# Patient Record
Sex: MALE | Race: WHITE | Employment: OTHER | URBAN - METROPOLITAN AREA
[De-identification: names, ages, dates, MRNs, and addresses within clinical notes are randomized per-mention and may not be internally consistent; named-entity substitution may affect disease eponyms.]

---

## 2017-05-01 ENCOUNTER — RECORDS - HEALTHEAST (OUTPATIENT)
Dept: LAB | Facility: CLINIC | Age: 72
End: 2017-05-01

## 2017-05-01 LAB
CHOLEST SERPL-MCNC: 165 MG/DL
FASTING STATUS PATIENT QL REPORTED: NORMAL
HDLC SERPL-MCNC: 44 MG/DL
LDLC SERPL CALC-MCNC: 99 MG/DL
PSA SERPL-MCNC: 1.8 NG/ML (ref 0–6.5)
TRIGL SERPL-MCNC: 109 MG/DL

## 2017-11-08 ENCOUNTER — OFFICE VISIT - HEALTHEAST (OUTPATIENT)
Dept: AUDIOLOGY | Facility: CLINIC | Age: 72
End: 2017-11-08

## 2017-11-08 DIAGNOSIS — H73.91 TYMPANIC MEMBRANE DISORDER, RIGHT: ICD-10-CM

## 2017-11-08 DIAGNOSIS — H90.3 SENSORINEURAL HEARING LOSS, ASYMMETRICAL: ICD-10-CM

## 2017-12-08 ENCOUNTER — OFFICE VISIT - HEALTHEAST (OUTPATIENT)
Dept: OTOLARYNGOLOGY | Facility: CLINIC | Age: 72
End: 2017-12-08

## 2017-12-08 DIAGNOSIS — H90.3 ASNHL (ASYMMETRICAL SENSORINEURAL HEARING LOSS): ICD-10-CM

## 2017-12-21 ENCOUNTER — HOSPITAL ENCOUNTER (OUTPATIENT)
Dept: MRI IMAGING | Facility: HOSPITAL | Age: 72
Discharge: HOME | End: 2017-12-21
Attending: OTOLARYNGOLOGY

## 2017-12-21 DIAGNOSIS — H90.3 ASNHL (ASYMMETRICAL SENSORINEURAL HEARING LOSS): ICD-10-CM

## 2017-12-22 ENCOUNTER — COMMUNICATION - HEALTHEAST (OUTPATIENT)
Dept: OTOLARYNGOLOGY | Facility: CLINIC | Age: 72
End: 2017-12-22

## 2018-03-23 ENCOUNTER — COMMUNICATION - HEALTHEAST (OUTPATIENT)
Dept: ADMINISTRATIVE | Facility: CLINIC | Age: 73
End: 2018-03-23

## 2018-03-27 ENCOUNTER — OFFICE VISIT - HEALTHEAST (OUTPATIENT)
Dept: AUDIOLOGY | Facility: CLINIC | Age: 73
End: 2018-03-27

## 2018-03-27 DIAGNOSIS — H90.3 SENSORINEURAL HEARING LOSS, ASYMMETRICAL: ICD-10-CM

## 2018-04-30 ENCOUNTER — COMMUNICATION - HEALTHEAST (OUTPATIENT)
Dept: AUDIOLOGY | Facility: CLINIC | Age: 73
End: 2018-04-30

## 2018-05-07 ENCOUNTER — RECORDS - HEALTHEAST (OUTPATIENT)
Dept: LAB | Facility: CLINIC | Age: 73
End: 2018-05-07

## 2018-05-07 LAB
ALBUMIN SERPL-MCNC: 3.7 G/DL (ref 3.5–5)
ALP SERPL-CCNC: 84 U/L (ref 45–120)
ALT SERPL W P-5'-P-CCNC: 14 U/L (ref 0–45)
ANION GAP SERPL CALCULATED.3IONS-SCNC: 11 MMOL/L (ref 5–18)
AST SERPL W P-5'-P-CCNC: 18 U/L (ref 0–40)
BILIRUB SERPL-MCNC: 0.9 MG/DL (ref 0–1)
BUN SERPL-MCNC: 19 MG/DL (ref 8–28)
CALCIUM SERPL-MCNC: 9.6 MG/DL (ref 8.5–10.5)
CHLORIDE BLD-SCNC: 109 MMOL/L (ref 98–107)
CHOLEST SERPL-MCNC: 157 MG/DL
CO2 SERPL-SCNC: 23 MMOL/L (ref 22–31)
CREAT SERPL-MCNC: 1.1 MG/DL (ref 0.7–1.3)
FASTING STATUS PATIENT QL REPORTED: NORMAL
GFR SERPL CREATININE-BSD FRML MDRD: >60 ML/MIN/1.73M2
GLUCOSE BLD-MCNC: 95 MG/DL (ref 70–125)
HDLC SERPL-MCNC: 45 MG/DL
LDLC SERPL CALC-MCNC: 89 MG/DL
POTASSIUM BLD-SCNC: 5 MMOL/L (ref 3.5–5)
PROT SERPL-MCNC: 6.4 G/DL (ref 6–8)
PSA SERPL-MCNC: 1.7 NG/ML (ref 0–6.5)
SODIUM SERPL-SCNC: 143 MMOL/L (ref 136–145)
TRIGL SERPL-MCNC: 116 MG/DL
URATE SERPL-MCNC: 7.2 MG/DL (ref 3–8)

## 2018-05-29 ENCOUNTER — OFFICE VISIT - HEALTHEAST (OUTPATIENT)
Dept: AUDIOLOGY | Facility: CLINIC | Age: 73
End: 2018-05-29

## 2018-05-29 DIAGNOSIS — H90.3 SENSORINEURAL HEARING LOSS, ASYMMETRICAL: ICD-10-CM

## 2018-06-12 ENCOUNTER — OFFICE VISIT - HEALTHEAST (OUTPATIENT)
Dept: AUDIOLOGY | Facility: CLINIC | Age: 73
End: 2018-06-12

## 2018-06-12 DIAGNOSIS — H90.3 SENSORINEURAL HEARING LOSS, ASYMMETRICAL: ICD-10-CM

## 2019-05-10 ENCOUNTER — RECORDS - HEALTHEAST (OUTPATIENT)
Dept: LAB | Facility: CLINIC | Age: 74
End: 2019-05-10

## 2019-05-10 LAB
ALBUMIN SERPL-MCNC: 4 G/DL (ref 3.5–5)
ALP SERPL-CCNC: 73 U/L (ref 45–120)
ALT SERPL W P-5'-P-CCNC: 15 U/L (ref 0–45)
ANION GAP SERPL CALCULATED.3IONS-SCNC: 10 MMOL/L (ref 5–18)
AST SERPL W P-5'-P-CCNC: 16 U/L (ref 0–40)
BILIRUB SERPL-MCNC: 0.7 MG/DL (ref 0–1)
BUN SERPL-MCNC: 25 MG/DL (ref 8–28)
CALCIUM SERPL-MCNC: 10.5 MG/DL (ref 8.5–10.5)
CHLORIDE BLD-SCNC: 107 MMOL/L (ref 98–107)
CHOLEST SERPL-MCNC: 189 MG/DL
CO2 SERPL-SCNC: 24 MMOL/L (ref 22–31)
CREAT SERPL-MCNC: 1.28 MG/DL (ref 0.7–1.3)
FASTING STATUS PATIENT QL REPORTED: YES
GFR SERPL CREATININE-BSD FRML MDRD: 55 ML/MIN/1.73M2
GLUCOSE BLD-MCNC: 99 MG/DL (ref 70–125)
HDLC SERPL-MCNC: 50 MG/DL
LDLC SERPL CALC-MCNC: 108 MG/DL
POTASSIUM BLD-SCNC: 4.8 MMOL/L (ref 3.5–5)
PROT SERPL-MCNC: 6.6 G/DL (ref 6–8)
PSA SERPL-MCNC: 1.9 NG/ML (ref 0–6.5)
SODIUM SERPL-SCNC: 141 MMOL/L (ref 136–145)
TRIGL SERPL-MCNC: 157 MG/DL
URATE SERPL-MCNC: 7.4 MG/DL (ref 3–8)

## 2019-06-04 ENCOUNTER — COMMUNICATION - HEALTHEAST (OUTPATIENT)
Dept: AUDIOLOGY | Facility: CLINIC | Age: 74
End: 2019-06-04

## 2019-06-21 ENCOUNTER — OFFICE VISIT - HEALTHEAST (OUTPATIENT)
Dept: AUDIOLOGY | Facility: CLINIC | Age: 74
End: 2019-06-21

## 2019-06-21 DIAGNOSIS — H90.3 SENSORINEURAL HEARING LOSS, ASYMMETRICAL: ICD-10-CM

## 2019-08-07 ENCOUNTER — HOSPITAL ENCOUNTER (EMERGENCY)
Facility: CLINIC | Age: 74
Discharge: HOME | End: 2019-08-07
Attending: EMERGENCY MEDICINE | Admitting: EMERGENCY MEDICINE
Payer: COMMERCIAL

## 2019-08-07 ENCOUNTER — APPOINTMENT (OUTPATIENT)
Dept: GENERAL RADIOLOGY | Facility: CLINIC | Age: 74
End: 2019-08-07
Attending: EMERGENCY MEDICINE
Payer: COMMERCIAL

## 2019-08-07 VITALS
HEIGHT: 72 IN | SYSTOLIC BLOOD PRESSURE: 148 MMHG | OXYGEN SATURATION: 96 % | BODY MASS INDEX: 33.86 KG/M2 | DIASTOLIC BLOOD PRESSURE: 78 MMHG | HEART RATE: 73 BPM | TEMPERATURE: 98 F | WEIGHT: 250 LBS | RESPIRATION RATE: 18 BRPM

## 2019-08-07 DIAGNOSIS — S09.90XA INJURY OF HEAD, INITIAL ENCOUNTER: ICD-10-CM

## 2019-08-07 DIAGNOSIS — S22.32XA CLOSED FRACTURE OF ONE RIB OF LEFT SIDE, INITIAL ENCOUNTER: ICD-10-CM

## 2019-08-07 PROCEDURE — 71101 X-RAY EXAM UNILAT RIBS/CHEST: CPT | Mod: LT

## 2019-08-07 PROCEDURE — 99283 EMERGENCY DEPT VISIT LOW MDM: CPT

## 2019-08-07 RX ORDER — ALLOPURINOL 100 MG/1
100 TABLET ORAL DAILY
COMMUNITY

## 2019-08-07 RX ORDER — OXYCODONE HYDROCHLORIDE 5 MG/1
2.5-5 TABLET ORAL EVERY 6 HOURS PRN
Qty: 6 TABLET | Refills: 0 | Status: SHIPPED | OUTPATIENT
Start: 2019-08-07

## 2019-08-07 RX ORDER — LIDOCAINE 50 MG/G
PATCH TOPICAL
Qty: 12 PATCH | Refills: 0 | Status: SHIPPED | OUTPATIENT
Start: 2019-08-07

## 2019-08-07 RX ORDER — SIMVASTATIN 40 MG
40 TABLET ORAL AT BEDTIME
COMMUNITY

## 2019-08-07 ASSESSMENT — ENCOUNTER SYMPTOMS
SHORTNESS OF BREATH: 0
HEMATURIA: 0
BACK PAIN: 1
CONFUSION: 0
COUGH: 1

## 2019-08-07 ASSESSMENT — MIFFLIN-ST. JEOR: SCORE: 1921.99

## 2019-08-07 NOTE — ED AVS SNAPSHOT
Emergency Department  [address] [place] [id number]  Phone:  [phone]  Fax:  [phone]                                    Isidoro Jalloh   MRN: [id number]    Department:   Emergency Department   Date of Visit:  8/7/2019           After Visit Summary Signature Page    I have received my discharge instructions, and my questions have been answered. I have discussed any challenges I see with this plan with the nurse or doctor.    ..........................................................................................................................................  Patient/Patient Representative Signature      ..........................................................................................................................................  Patient Representative Print Name and Relationship to Patient    ..................................................               ................................................  Date                                   Time    ..........................................................................................................................................  Reviewed by Signature/Title    ...................................................              ..............................................  Date                                               Time          22EPIC Rev 08/18
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
## 2019-08-08 NOTE — DISCHARGE INSTRUCTIONS
Prescription strength dosing instructions:  - 600mg ibuprofen (Advil, Motrin) every 6 hours as needed for fever/pain/inflammation.  Naprosyn (Naproxen, Aleve) works similarly and can be used instead, if preferred.  - 650mg acetaminophen (tylenol) every 4 hours or 1000mg every 6 hours (maximum of 4000mg in 24 hours).  Acetaminophen is often in combination over the counter and prescription pain medications.  Be sure to include this in daily total.    These medications work differently and can be used in combination.      Opioid Medication Information    You have been given a prescription for an opioid (narcotic) pain medicine and/or have received a pain medicine while here in the Emergency Department. These medicines can make you drowsy or impaired. You must not drive, operate dangerous equipment, or engage in any other dangerous activities while taking these medications. If you drive while taking these medications, you could be arrested for driving under the influence (DUI). Do not drink any alcohol while you are taking these medications.     Opioid pain medications can cause addiction. If you have a history of chemical dependency of any type, you are at a higher risk of becoming addicted to pain medications.  Only take these prescribed medications to treat your pain when all other options have been tried. Take it for as short a time and as few doses as possible. Store your pain pills in a secure place, as they are frequently stolen and provide a dangerous opportunity for children or visitors in your house to start abusing these powerful medications. We will not replace any lost or stolen medicine.    If you do not finish your medication, it is a good idea to get rid of it but please do not flush it down the toilet. Please dispose of the remaining medication at a local pharmacy or law enforcement facility. The [place] [employer] has additional information on medication disposal:  https://www.pca.[facility].[place]/living-green/managing-unwanted-medications.      Many prescription pain medications contain Tylenol  (acetaminophen), including Vicodin , Tylenol #3 , Norco , Lortab , and Percocet .  You should not take any extra pills of Tylenol  if you are using these prescription medications or you can get very sick.  Do not ever take more than 3000 mg of acetaminophen in any 24 hour period.    All opioids tend to cause constipation. Drink plenty of water and eat foods that have a lot of fiber, such as fruits, vegetables, prune juice, apple juice and high fiber cereal.  Take a laxative if you don t move your bowels at least every other day. Miralax , Milk of Magnesia, Colace , or Senna  can be used to keep you regular.
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
## 2019-08-20 ENCOUNTER — OFFICE VISIT - HEALTHEAST (OUTPATIENT)
Dept: AUDIOLOGY | Facility: CLINIC | Age: 74
End: 2019-08-20

## 2019-08-20 DIAGNOSIS — H90.3 SENSORINEURAL HEARING LOSS, ASYMMETRICAL: ICD-10-CM

## 2019-09-03 ENCOUNTER — OFFICE VISIT - HEALTHEAST (OUTPATIENT)
Dept: OTOLARYNGOLOGY | Facility: CLINIC | Age: 74
End: 2019-09-03

## 2019-09-03 DIAGNOSIS — H61.21 IMPACTED CERUMEN OF RIGHT EAR: ICD-10-CM

## 2019-11-08 ENCOUNTER — OFFICE VISIT - HEALTHEAST (OUTPATIENT)
Dept: OTOLARYNGOLOGY | Facility: CLINIC | Age: 74
End: 2019-11-08

## 2019-11-08 DIAGNOSIS — H61.23 BILATERAL IMPACTED CERUMEN: ICD-10-CM

## 2020-03-11 ENCOUNTER — HEALTH MAINTENANCE LETTER (OUTPATIENT)
Age: 75
End: 2020-03-11

## 2021-01-03 ENCOUNTER — HEALTH MAINTENANCE LETTER (OUTPATIENT)
Age: 76
End: 2021-01-03

## 2021-04-25 ENCOUNTER — HEALTH MAINTENANCE LETTER (OUTPATIENT)
Age: 76
End: 2021-04-25

## 2021-05-29 NOTE — TELEPHONE ENCOUNTER
----- Message from Debora Spring sent at 6/12/2018  8:18 AM CDT -----  Regarding: Please call patient  Hello,    Please contact this patient to schedule a 60 min hearing test and hearing aid check at their earliest convenience.     Thank you!    Leny

## 2021-05-29 NOTE — PROGRESS NOTES
Hearing Aid Check    Isidoro Jalloh returns today for a hearing aid check.  He reports he finds the hearing aids helpful in a variety of situations. He does report that he doesn't feel he needs the premium technology level, but understands he can no longer exchange his hearing aids. He reports his right hearing aid stopped working about a week ago.     Visual inspection:  Significant cerumen on both hearing aids. Replaced domes(small open right, medium closed left) and wax traps with a clear signal found bilaterally.  Action:  Patient reports he has never cleaned his hearing aids. He is appreciative of the review today. Provided him with additional supplies. Reviewed how to use volume control function (right-raise, left-lower).     He reports subjective improvement with today s changes. He will follow up in 1 year or as needed.    Markus Young, CCC-A  [place] Licensed Audiologist #[id number]

## 2021-05-31 NOTE — PROGRESS NOTES
HPI: This patient presents to clinic today for cerumen removal. Has noticed some fullness of the ears and muffled hearing, but denies otalgia, otorrhea, vertigo, change in true hearing or tinnitus. Denies other symptoms.    Past medical history, surgical history, family history, social history, medications, and allergies have been reviewed and are documented above.     Review of Systems: a 10-system review was performed. Symptoms are noted in the HPI and on a scanned document in the computer chart.    Physical Examination:   GEN: no acute distress, alert and oriented  EYES: extraocular movements intact, pupils equal and round. Sclera clear.   EARS:Right cerumen impactions cleared under binocular microscopy using microdissection. The tympanic membrane is noted to be intact and clear with no signs of infections, effusions, perforations, or retractions.  Contralateral side is clear of cerumen, tympanic membrane normal.  PULM: breathing comfortably on room air with no stertor or stridor. Chest expansion symmetric.  HEART: regular rate and rhythm, no peripheral edema    MEDICAL DECISION-MAKING: cerumen impactions cleared under binocular microscopy without difficulty. Hearing restored to baseline. Follow-up PRN.
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
## 2021-06-03 NOTE — PROGRESS NOTES
HPI:  Comes in with plugging in his ears.     Past medical history, surgical history, social history, family history, medications, and allergies have been reviewed with the patient and are documented above.    Review of Systems: a 10-system review was performed. Pertinent positives are noted in the HPI and on a separate scanned document in the chart.    PHYSICAL EXAMINATION:  GEN: no acute distress, normocephalic  EYES: extraocular movements are intact, pupils are equal and round. Sclera clear.   EARS:   PROCEDURE  Cerumen removal  The left ear is examined under the microscope and a cerumen impaction is removed with microdissection technique.  EAC and TM are normal.  This is repeated on the contralateral side with similar findings.  NEURO: CN II-XII are intact bilaterally. alert and oriented. No nystagmus. Gait is normal.  PULM: breathing comfortably on room air, normal chest expansion with respiration  HEART: regular rate and rhythm, no peripheral edema    MEDICAL DECISION-MAKING:   Follow up PRN.

## 2021-06-14 NOTE — PROGRESS NOTES
Isidoro Jalloh is a 72 y.o. male seen in consultation for hearing loss.  Patient has noticed gradual hearing loss over several years/months.  Denies otologic history of infections or surgeries. Denies tinnitus and vertigo      ALLERGY:  No Known Allergies    MEDICATIONS:     No current outpatient prescriptions on file prior to visit.     No current facility-administered medications on file prior to visit.        Past Medical/Surgical History, Family History and Social History reviewed in detail and documented separately in the medical record.    Complete Review of Systems:  A 10-point review was performed.  Pertinent positives are noted in the HPI and on a separate scanned document in the chart.    EXAM:  There were no vitals filed for this visit.    Nurse documentation reviewed  and documented separately.    General Appearance: Pleasant, alert, appropriate appearance for age. No acute distress    Head Exam: Normal. Normocephalic, atraumatic.    Eye Exam: Normal external eye, conjunctiva, lids, cornea. Extra-ocular movements are intact.    Left external ear: normal  Left otoscopic exam: Normal EAC. Normal TM     Right external ear: normal  Right otoscopic exam: Normal EAC. Possible some dried wax on surface of drum, otherwise intact.    Nose Exam: Normal external nose. Septum midline. Nasal mucosa normal.  Inferior turbinates normal.    OroPharynx Exam: Dental hygiene adequate. Normal tongue. Normal buccal mucosa. Normal palate.  Normal pharynx. Normal tonsils.    Neck Exam: Supple, no masses or nodes. Trachea and larynx midline.    Thyroid Exam: No tenderness, nodules or enlargement.    Salivary Glands: nontender without masses    Neuro: Alert and oriented times 3, CN 2-12 grossly intact, no nystagmus, PERRL, EOMI, normal speech and gait    Chest/Respiratory Exam: Normal chest wall motion and respiratory effort. No audible stridor or wheezing.    Cardiovascular Exam: Regular rate and rhythm.  No cyanosis,  clubbing or edema.    Pulses: carotid pulses normal    ASSESSMENT:  ASNHL  PLAN: Findings, assessment, and management options were discussed. Will get MRI to rule in or out any middle ear or retrocochlear process.  Will call with results.  He is medically cleared for amplification should he wish.  Follow up annual.

## 2021-06-14 NOTE — PROGRESS NOTES
"Audiology only; self-referred    History:  Patient reported his family wanted him to get his hearing tested; he admitted to having difficulty hearing conversation when in large groups or in noisy environments. Onset of difficulty was gradual. Patient reported \"eczema\" in his right ear, with right-sided aural fullness, and occasional right-sided otalgia. He denied tinnitus, dizziness, recent illness, or significant noise exposure.    Results:  Otoscopy was unremarkable in the left ear, but revealed a bright yellow tympanic membrane in the right ear, with possible right otorrhea in the canal.  Hearing sensitivity was assessed with good reliability using insert phones.      Right ear:   Normal hearing sensitivity for 250-1500 Hz, sloping to mild-severe sensorineural hearing loss for 5443-3143 Hz.   Left ear:  Normal hearing sensitivity for 250-1000 Hz, sloping to moderate sensorineural hearing loss for 5019-9948 Hz.  Hearing asymmetry was noted between ears; Steffen was negative at multiple frequencies.      Speech reception thresholds showed agreement with pure tone findings in each ear. Word recognition ability was excellent for the right ear; good for the left ear, with presentation levels at or above typical conversational volume in both ears.    Tympanometry was consistent with normal middle ear function, bilaterally (negative middle ear pressure with normal static admittance was noted in the right ear only).    Recommendations:  Follow-up with ENT (scheduled with Isaiah Delatorre MD, on 12-8-17); retest hearing annually (to monitor) or per medical management.  Wear hearing protection consistently in noise.  Isidoro Jalloh is a potential binaural amplification candidate, if patient motivation exists and medical clearance is granted.    Dragan Alexandra, [facility]-A  [place] Licensed Audiologist [id number]          "

## 2021-06-18 NOTE — PROGRESS NOTES
Hearing Aid Check    Isidoro Jalloh returns today for a hearing aid check.  He reports satisfaction with his Audeo B90 Rechargeable hearing aids. He reports the hearing aids are comfortable and is pleased to have the rechargeable option. He finds some environmental sounds like fabric brushing and bags crinkling to be rather noticeable. His wife reports the television volume has been able to be turned down when Isidoro is wearing his hearing aids.     Data Logging: appropriate use  Visual inspection:  Hearing aids turned on and inserted properly.   Action:  Connected hearing aids to programming software. Decreased threshold kneepoints in high-frequencies bilaterally. Activated volume control (right raise, left lower). Demonstrated how to change wax traps and provided patient with additional domes (R-small open, L-medium closed)    He reports subjective improvement with today s changes. He will follow up in 1 year or as needed.    Markus Young, CCC-A  [place] Licensed Audiologist #[id number]

## 2021-06-18 NOTE — PROGRESS NOTES
Hearing Aid Fitting    The patient was seen today for a hearing aid fitting. He is accompanied to today's appointment by his wife. Due to a scheduling miscommunication, over six months has passed since patient's hearing was last tested. Hearing was rechecked today and found to be stable re: 11/8/2017 evaluation.   He has been medically cleared for devices from Dr. Delatorre. He was fit with bilateral Phonak Audeo B90 BTE MIGUEL ANGEL rechargeable devices.   Real ear measurements revealed a good match to NAL-NL2 targets at 100% of target gain.     Hearing aid:  : Phonak  Devices:  Audeo B90 BTE MIGUEL ANGEL  Style:  MIGUEL ANGEL BTE  Serial numbers:  [id number], [id number]      Otoscopy reveals no occlusions, bilaterally.  The patient reports satisfaction with the fit and sound quality of the instruments. Overall gain is set to 90% per patient request.   Use, care, trial period and realistic expectations were reviewed in detail.  Push button is set to off, will revist volume control at first recheck appointment.   He is able to demonstrate independent manipulation of the instruments with battery care and insertion/removal.  He is given written instruction on use, care, and warranty. A purchase agreement was signed as hearing aid was ordered over the telephone.  He sets up a follow up appointment in about 2 weeks.    The patient verbalized understanding and is in agreement with this plan.    Markus Young, CCC-A  [place] Licensed Audiologist #[id number]

## 2021-08-20 ENCOUNTER — TELEPHONE (OUTPATIENT)
Dept: AUDIOLOGY | Facility: CLINIC | Age: 76
End: 2021-08-20

## 2021-08-20 NOTE — TELEPHONE ENCOUNTER
Spoke with Colt and informed him that he may drop off both hearing aids at the  so that they may be sent in for an [id number] check. Explained that this hearing aids should return from repair in 2-3 weeks and he will be called to pick them up.     Patient very pleased with information and will drop off hearing aids soon.    Dragan Hou, CCC-A  Clinical Audiologist  [place] #[id number]

## 2021-08-20 NOTE — TELEPHONE ENCOUNTER
"----- Message from Key Foley sent at 8/19/2021  4:28 PM CDT -----  Colt Duffy was told by one of the audiologists that he saw in the past to bring in his hearing aid before the warranty is up to have it \"worked over\". Not sure what that means but he said they would send them out to the company or something like that. He was going to make an appointment but then asked to speak to an Audiologist first so he knows what he needs to do. He can be reached at [phone].    Thank you    "

## 2021-09-01 ENCOUNTER — DOCUMENTATION ONLY (OUTPATIENT)
Dept: AUDIOLOGY | Facility: CLINIC | Age: 76
End: 2021-09-01

## 2021-09-01 ENCOUNTER — ALLIED HEALTH/NURSE VISIT (OUTPATIENT)
Dept: AUDIOLOGY | Facility: CLINIC | Age: 76
End: 2021-09-01

## 2021-09-01 DIAGNOSIS — H90.3 SENSORINEURAL HEARING LOSS, BILATERAL: Primary | ICD-10-CM

## 2021-09-01 PROCEDURE — 99207 PR NO CHARGE LOS: CPT | Performed by: AUDIOLOGIST

## 2021-09-01 NOTE — PROGRESS NOTES
Patient dropped hearing aids off with  to be sent into  for a check before the warranty ends. He stated there are no current issues with the hearing aids. Cleaned and checked hearing aids, listening check confirmed hearing aids working properly.     Looked up warranty and it ended 7/1/2021. Called [employer], a    extended the warranty for three months (confirmation number [id number]). Hearing aids sent to [facility] to be looked over/checked throughly prior to warranty end date.    Please call patient when ready for .

## 2021-09-13 ENCOUNTER — TELEPHONE (OUTPATIENT)
Dept: AUDIOLOGY | Facility: CLINIC | Age: 76
End: 2021-09-13

## 2021-09-13 NOTE — TELEPHONE ENCOUNTER
Called and let patient know the hearing aids are back from the , and ready to be picked up. Listening check reveladed proper function of the hearing aids. Patient reported he will pick them up 9/15/2021 at the .

## 2021-10-01 ENCOUNTER — TELEPHONE (OUTPATIENT)
Dept: AUDIOLOGY | Facility: CLINIC | Age: 76
End: 2021-10-01

## 2021-10-01 NOTE — TELEPHONE ENCOUNTER
Received a message that Colt called wondering about a bill he received for his hearing aid drop off, as he thought this was a covered service. Unable to speak to patient left a voicemail, will try to call back on Monday 10/4/2021.

## 2021-10-04 ENCOUNTER — TELEPHONE (OUTPATIENT)
Dept: AUDIOLOGY | Facility: CLINIC | Age: 76
End: 2021-10-04

## 2021-10-04 NOTE — TELEPHONE ENCOUNTER
Isidoro called back and said thanks for the return call. Also, wanted to thank you for straightening out the bill and deleting the $30 owed that he shouldn't of owed.    Much appreciated!

## 2021-10-04 NOTE — TELEPHONE ENCOUNTER
Called patient and left a voicemail. After double checking with Suha Caal patient will not be charged for the hearing aid clean and check. This charge will be removed by Suha. Gave patient phone number to call back if he has questions.

## 2021-10-10 ENCOUNTER — HEALTH MAINTENANCE LETTER (OUTPATIENT)
Age: 76
End: 2021-10-10

## 2022-05-21 ENCOUNTER — HEALTH MAINTENANCE LETTER (OUTPATIENT)
Age: 77
End: 2022-05-21

## 2022-09-18 ENCOUNTER — HEALTH MAINTENANCE LETTER (OUTPATIENT)
Age: 77
End: 2022-09-18

## 2023-06-04 ENCOUNTER — HEALTH MAINTENANCE LETTER (OUTPATIENT)
Age: 78
End: 2023-06-04

## 2024-07-14 ENCOUNTER — HEALTH MAINTENANCE LETTER (OUTPATIENT)
Age: 79
End: 2024-07-14